# Patient Record
Sex: FEMALE | Race: WHITE | NOT HISPANIC OR LATINO | Employment: OTHER | URBAN - METROPOLITAN AREA
[De-identification: names, ages, dates, MRNs, and addresses within clinical notes are randomized per-mention and may not be internally consistent; named-entity substitution may affect disease eponyms.]

---

## 2023-09-02 PROBLEM — E04.9 NONTOXIC NODULAR GOITER: Status: ACTIVE | Noted: 2023-09-02

## 2023-09-02 PROBLEM — R42 LIGHTHEADEDNESS: Status: ACTIVE | Noted: 2023-09-02

## 2023-09-02 PROBLEM — D64.9 ANEMIA: Status: ACTIVE | Noted: 2023-09-02

## 2023-09-02 PROBLEM — I25.10 CAD (CORONARY ARTERY DISEASE): Status: ACTIVE | Noted: 2023-09-02

## 2023-09-02 PROBLEM — I36.1 NON-RHEUMATIC TRICUSPID VALVE INSUFFICIENCY: Status: ACTIVE | Noted: 2023-09-02

## 2023-09-02 PROBLEM — E07.9 THYROID DISEASE: Status: ACTIVE | Noted: 2023-09-02

## 2023-09-02 PROBLEM — G89.29 CHRONIC PAIN OF TOE OF RIGHT FOOT: Status: ACTIVE | Noted: 2023-09-02

## 2023-09-02 PROBLEM — I00 RHEUMATIC FEVER: Status: ACTIVE | Noted: 2023-09-02

## 2023-09-02 PROBLEM — K62.5 BRBPR (BRIGHT RED BLOOD PER RECTUM): Status: ACTIVE | Noted: 2023-09-02

## 2023-09-02 PROBLEM — I51.89 COMBINED SYSTOLIC AND DIASTOLIC CARDIAC DYSFUNCTION: Status: ACTIVE | Noted: 2023-09-02

## 2023-09-02 PROBLEM — R53.81 PHYSICAL DEBILITY: Status: ACTIVE | Noted: 2023-09-02

## 2023-09-02 PROBLEM — I49.5 SICK SINUS SYNDROME (MULTI): Status: ACTIVE | Noted: 2023-09-02

## 2023-09-02 PROBLEM — I48.20 CHRONIC A-FIB (MULTI): Status: ACTIVE | Noted: 2023-09-02

## 2023-09-02 PROBLEM — M79.674 CHRONIC PAIN OF TOE OF RIGHT FOOT: Status: ACTIVE | Noted: 2023-09-02

## 2023-09-02 PROBLEM — I51.9 MILD DIASTOLIC DYSFUNCTION: Status: ACTIVE | Noted: 2023-09-02

## 2023-09-02 PROBLEM — N18.9 CKD (CHRONIC KIDNEY DISEASE): Status: ACTIVE | Noted: 2023-09-02

## 2023-09-02 PROBLEM — M75.100 ROTATOR CUFF TEAR: Status: ACTIVE | Noted: 2023-09-02

## 2023-09-02 PROBLEM — Z96.651 KNEE JOINT REPLACEMENT STATUS, RIGHT: Status: ACTIVE | Noted: 2023-09-02

## 2023-09-02 PROBLEM — I50.9 CHF (CONGESTIVE HEART FAILURE) (MULTI): Status: ACTIVE | Noted: 2023-09-02

## 2023-09-02 PROBLEM — M81.0 OSTEOPOROSIS: Status: ACTIVE | Noted: 2023-09-02

## 2023-09-02 PROBLEM — I83.93 VARICOSE VEINS OF LEGS: Status: ACTIVE | Noted: 2023-09-02

## 2023-09-02 PROBLEM — B35.1 NAIL FUNGUS: Status: ACTIVE | Noted: 2023-09-02

## 2023-09-02 PROBLEM — M19.90 ARTHRITIS: Status: ACTIVE | Noted: 2023-09-02

## 2023-09-02 PROBLEM — G89.29 CHRONIC PAIN OF TOE OF LEFT FOOT: Status: ACTIVE | Noted: 2023-09-02

## 2023-09-02 PROBLEM — Z98.890 S/P MITRAL VALVE REPAIR: Status: ACTIVE | Noted: 2023-09-02

## 2023-09-02 PROBLEM — I27.20 PULMONARY HYPERTENSION (MULTI): Status: ACTIVE | Noted: 2023-09-02

## 2023-09-02 PROBLEM — M79.675 CHRONIC PAIN OF TOE OF LEFT FOOT: Status: ACTIVE | Noted: 2023-09-02

## 2023-09-02 PROBLEM — Z98.890 S/P TRICUSPID VALVE REPAIR: Status: ACTIVE | Noted: 2023-09-02

## 2023-09-02 PROBLEM — I10 HYPERTENSION: Status: ACTIVE | Noted: 2023-09-02

## 2023-09-02 PROBLEM — I35.1 MILD AORTIC INSUFFICIENCY: Status: ACTIVE | Noted: 2023-09-02

## 2023-09-02 PROBLEM — R60.0 BILATERAL LEG EDEMA: Status: ACTIVE | Noted: 2023-09-02

## 2023-09-02 PROBLEM — Z95.0 PACEMAKER: Status: ACTIVE | Noted: 2023-09-02

## 2023-09-02 RX ORDER — SERTRALINE HYDROCHLORIDE 25 MG/1
1 TABLET, FILM COATED ORAL DAILY
COMMUNITY
Start: 2019-07-13 | End: 2023-10-20 | Stop reason: ALTCHOICE

## 2023-09-02 RX ORDER — WARFARIN 3 MG/1
1 TABLET ORAL DAILY
COMMUNITY
End: 2023-10-20 | Stop reason: ALTCHOICE

## 2023-09-02 RX ORDER — MECLIZINE HCL 12.5 MG 12.5 MG/1
1-2 TABLET ORAL
COMMUNITY
End: 2023-12-22 | Stop reason: ALTCHOICE

## 2023-09-02 RX ORDER — TRAMADOL HYDROCHLORIDE 50 MG/1
TABLET ORAL
COMMUNITY
End: 2023-10-20 | Stop reason: ALTCHOICE

## 2023-09-02 RX ORDER — OMEPRAZOLE 40 MG/1
CAPSULE, DELAYED RELEASE ORAL
COMMUNITY
End: 2023-10-20 | Stop reason: ALTCHOICE

## 2023-09-02 RX ORDER — DEXTROMETHORPHAN HYDROBROMIDE, GUAIFENESIN 5; 100 MG/5ML; MG/5ML
650 LIQUID ORAL
COMMUNITY
End: 2023-10-20 | Stop reason: ALTCHOICE

## 2023-09-02 RX ORDER — ADHESIVE BANDAGE
BANDAGE TOPICAL
COMMUNITY

## 2023-09-02 RX ORDER — DEXTROMETHORPHAN HBR. AND GUAIFENESIN 10; 100 MG/5ML; MG/5ML
SOLUTION ORAL
COMMUNITY

## 2023-09-02 RX ORDER — TROLAMINE SALICYLATE 10 G/100G
1 CREAM TOPICAL SEE ADMIN INSTRUCTIONS
COMMUNITY

## 2023-09-02 RX ORDER — ACETAMINOPHEN 325 MG/1
1-2 TABLET ORAL EVERY 4 HOURS PRN
COMMUNITY

## 2023-09-02 RX ORDER — ACETAMINOPHEN 500 MG
TABLET ORAL
COMMUNITY
End: 2023-12-22 | Stop reason: ALTCHOICE

## 2023-09-02 RX ORDER — METOPROLOL TARTRATE 25 MG/1
1 TABLET, FILM COATED ORAL 2 TIMES DAILY
COMMUNITY
End: 2023-10-20 | Stop reason: ALTCHOICE

## 2023-09-02 RX ORDER — ACETAMINOPHEN 500 MG
1000 TABLET ORAL 3 TIMES DAILY
COMMUNITY
End: 2023-10-20 | Stop reason: ALTCHOICE

## 2023-09-02 RX ORDER — ALLOPURINOL 100 MG/1
1 TABLET ORAL DAILY
COMMUNITY

## 2023-09-02 RX ORDER — GUAIFENESIN 400 MG/1
TABLET ORAL
COMMUNITY
End: 2023-12-22 | Stop reason: DRUGHIGH

## 2023-09-02 RX ORDER — FERROUS SULFATE 325(65) MG
325 TABLET ORAL DAILY
COMMUNITY
Start: 2021-08-31

## 2023-09-02 RX ORDER — IPRATROPIUM BROMIDE AND ALBUTEROL SULFATE 2.5; .5 MG/3ML; MG/3ML
3 SOLUTION RESPIRATORY (INHALATION)
COMMUNITY

## 2023-09-02 RX ORDER — BUMETANIDE 2 MG/1
1 TABLET ORAL
COMMUNITY

## 2023-09-02 RX ORDER — SPIRONOLACTONE 25 MG/1
1 TABLET ORAL DAILY
COMMUNITY
End: 2023-12-22 | Stop reason: DRUGHIGH

## 2023-09-02 RX ORDER — PANTOPRAZOLE SODIUM 40 MG/1
40 TABLET, DELAYED RELEASE ORAL DAILY
COMMUNITY

## 2023-10-20 ENCOUNTER — TELEPHONE (OUTPATIENT)
Dept: CARDIOLOGY | Facility: CLINIC | Age: 88
End: 2023-10-20

## 2023-10-20 ENCOUNTER — OFFICE VISIT (OUTPATIENT)
Dept: CARDIOLOGY | Facility: CLINIC | Age: 88
End: 2023-10-20
Payer: COMMERCIAL

## 2023-10-20 VITALS
WEIGHT: 148 LBS | OXYGEN SATURATION: 96 % | HEART RATE: 71 BPM | BODY MASS INDEX: 24.66 KG/M2 | DIASTOLIC BLOOD PRESSURE: 62 MMHG | SYSTOLIC BLOOD PRESSURE: 114 MMHG | HEIGHT: 65 IN

## 2023-10-20 DIAGNOSIS — Z98.890 S/P TRICUSPID VALVE REPAIR: ICD-10-CM

## 2023-10-20 DIAGNOSIS — Z98.890 S/P MITRAL VALVE REPAIR: Primary | ICD-10-CM

## 2023-10-20 DIAGNOSIS — I48.20 CHRONIC A-FIB (MULTI): ICD-10-CM

## 2023-10-20 DIAGNOSIS — I27.20 PULMONARY HYPERTENSION (MULTI): ICD-10-CM

## 2023-10-20 DIAGNOSIS — I50.32 CHRONIC DIASTOLIC HEART FAILURE (MULTI): ICD-10-CM

## 2023-10-20 DIAGNOSIS — R06.09 DYSPNEA ON EXERTION: ICD-10-CM

## 2023-10-20 PROCEDURE — 1036F TOBACCO NON-USER: CPT | Performed by: INTERNAL MEDICINE

## 2023-10-20 PROCEDURE — 1160F RVW MEDS BY RX/DR IN RCRD: CPT | Performed by: INTERNAL MEDICINE

## 2023-10-20 PROCEDURE — 99215 OFFICE O/P EST HI 40 MIN: CPT | Performed by: INTERNAL MEDICINE

## 2023-10-20 PROCEDURE — 3078F DIAST BP <80 MM HG: CPT | Performed by: INTERNAL MEDICINE

## 2023-10-20 PROCEDURE — 3074F SYST BP LT 130 MM HG: CPT | Performed by: INTERNAL MEDICINE

## 2023-10-20 PROCEDURE — 1159F MED LIST DOCD IN RCRD: CPT | Performed by: INTERNAL MEDICINE

## 2023-10-20 RX ORDER — APIXABAN 2.5 MG/1
2.5 TABLET, FILM COATED ORAL 2 TIMES DAILY
COMMUNITY
Start: 2023-10-13

## 2023-10-20 RX ORDER — METOPROLOL SUCCINATE 25 MG/1
12.5 TABLET, EXTENDED RELEASE ORAL 2 TIMES DAILY
COMMUNITY
End: 2023-12-22 | Stop reason: DRUGHIGH

## 2023-10-20 NOTE — PATIENT INSTRUCTIONS
"You should increase your intake of fresh fruits and vegetables.  Try to consume 9-12 servings per day of such foods.  You should increase your intake of deep sea fish such as salmon and tuna.  Try to get two servings per week of fish, but if you are a pregnant woman, talk to your obstetrician before increasing your fish intake.  You should increase your intake of unprocessed nuts such as walnuts or almonds.  Increase your intake of plant-based protein.  You should avoid fried foods.  Don't consume sugary or starchy foods and sugary drinks.  Avoid saturated fats.  Try not to dine at restaurants more than once per month, and don't dine at fast food places.  Try to get 7-9 hours of sleep every night.  Try to get 150 minutes per week of moderate intensity exercise (after I have cleared you to start an exercise program).  Try to maintain the appropriate weight for your height based on body mass index (BMI). Maintain your cholesterol, blood sugar, and blood pressure in the recommended respective normal ranges.  There is a wealth of information on the American Heart Association's website regarding this.  Just Google \"Life's Essential 8\" for more information.   Ask me about any of these details  if you have questions.    As your cardiologist, I will be available to you at any time to answer any question you have concerning your heart health.  My staff, [facility] can also answer any questions you may have.  Best of luck.    "

## 2023-10-20 NOTE — TELEPHONE ENCOUNTER
Patient saw Dr. Morse today in office.  [facility] called to get clarification on medications.  Libra was taking Metoprolol Tartrate 12.5mg BID, per the After Visit Summary, it looks like Metoprolol was stopped, is this correct?    The [facility] will get Libra's ordered blood testing and chest x-ray done on Monday (10/23/23).    When calling back [facility] [phone] ask for [facility].    To Dr. Mosre for review.  ---ssd.

## 2023-10-20 NOTE — PROGRESS NOTES
"Chief Complaint:   See below (1 Yr. F/U - Not Sleeping)     History Of Present Illness:    Libra Marcelino is a 89 y.o. female presenting with chronic diastolic heart failure, chronic AF, pacemaker, S/P MV and TV repair.    The patient has a history of valvular heart disease and underwent mitral and tricuspid valve repairs with #31 ring in each position in April 2018. She has a history of sick sinus syndrome and is status post a prior [employer] Som pacemaker for which she follows with Dr. Valenzuela. She has chronic atrial fibrillation managed with rate control and anticoagulation. Her most recent echocardiogram in August 2021 disclosed normal LV function, moderate pulmonary hypertension moderate to severe tricuspid regurgitation, moderate mitral regurgitation, and moderate aortic regurgitation.     She lives at an extended care facility. She reports she that she gets more short of breath with walking around the [facility] where she lives. T the symptoms have become worse compared with her last visit here.  She also has mild to moderate lower extremity edema which is chronic.  He patient denies chest discomfort, palpitations, orthopnea, PND, syncope, and near syncope. She sleeps comfortably on one pillow.       Last Recorded Vitals:  Vitals:    10/20/23 1210   BP: 114/62   BP Location: Left arm   Patient Position: Sitting   BP Cuff Size: Large adult   Pulse: 71   SpO2: 96%   Weight: 67.1 kg (148 lb)   Height: 1.651 m (5' 5\")       Past Medical History:  She has no past medical history on file.    Past Surgical History:  She has a past surgical history that includes Knee Arthroplasty (04/04/2018); Other surgical history (04/04/2018); and Other surgical history (04/04/2018).      Social History:  She reports that she has never smoked. She has never used smokeless tobacco. She reports that she does not currently use alcohol. She reports that she does not use drugs.    Family History:  Family History   Problem Relation Name " Age of Onset    Goiter Mother      Lung cancer Mother      Thyroid disease Mother      Polycythemia Father      Lung cancer Sister          Allergies:  Patient has no known allergies.    Outpatient Medications:  Current Outpatient Medications   Medication Instructions    acetaminophen (TylenoL) 325 mg tablet 1-2 tablets, oral, Every 4 hours PRN    allopurinol (Zyloprim) 100 mg tablet 1 tablet, oral, Daily    bumetanide (Bumex) 2 mg tablet 1 tablet, oral, Daily    dextromethorphan-guaifenesin  mg/5 mL oral liquid Use as directed - as needed    Eliquis 2.5 mg, oral, 2 times daily    ferrous sulfate (FeosoL) 325 (65 Fe) MG tablet Feosol 200 (65 Fe) MG Oral Tablet   Quantity: 30  Refills: 0        Start : 31-Aug-2021   Active    guaiFENesin (Humibid 3) 400 mg tablet guaiFENesin TABS   Refills: 0       Active    ipratropium-albuteroL (Duo-Neb) 0.5-2.5 mg/3 mL nebulizer solution DuoNeb 0.5-2.5 (3) MG/3ML SOLN   Refills: 0       Active    mag hydrox/aluminum hyd/simeth (ALUM-MAG HYDROXIDE-SIMETH ORAL) Mylanta SUSP   Refills: 0       Active    magnesium hydroxide (Milk of Magnesia) 400 mg/5 mL suspension USE AS DIRECTED.    meclizine (Antivert) 12.5 mg tablet 1-2 tablets, oral, Daily RT    melatonin 5 mg tablet Melatonin 5 MG Oral Tablet   Refills: 0       Active    metoprolol succinate XL (TOPROL-XL) 12.5 mg, oral, 2 times daily, Do not crush or chew.    pantoprazole (ProtoNix) 40 mg EC tablet Pantoprazole Sodium 40 MG Oral Tablet Delayed Release   Refills: 0       Active    sennosides 15 mg tablet Senna 15 MG TABS   Refills: 0       Active    spironolactone (Aldactone) 25 mg tablet 1 tablet, oral, Daily    trolamine salicylate (Aspercreme) 10 % cream 1 Application, Topical, See admin instructions, Apply as directed by physician       Physical Exam:  GENERAL:  pleasant 89 year-old  HEENT: No xanthelasma  NECK: Supple, no palpable adenopathy or thyromegaly  CHEST: Clear to auscultation, respiratory effort  unlabored  CARDIAC: RRR, normal S1 and S2, no audible , rub, gallop, carotids are brisk, PMI is not displaced; there is a grade 2 systolic murmur heard at the left sternal border.  ABD: Active bowel sounds, nontender, no organomegaly, no evidence of ascites  EXT: No clubbing, cyanosis, , or tenderness.  There is 2-3+ bilateral lower extremity edema.  NEURO: Awake, alert, appropriate, speech is fluent       Last Labs:  CBC -  Lab Results   Component Value Date    WBC 6.2 02/28/2020    HGB 9.1 (L) 02/28/2020    HCT 27.8 (L) 02/28/2020    MCV 96 02/28/2020     02/28/2020       CMP -  Lab Results   Component Value Date    CALCIUM 8.4 (L) 02/28/2020    PHOS 3.4 02/28/2020    PROT 6.2 (L) 02/25/2020    ALBUMIN 3.4 02/25/2020    AST 11 02/25/2020    ALT 5 (L) 02/25/2020    ALKPHOS 84 02/25/2020    BILITOT 0.8 02/25/2020       LIPID PANEL -   Lab Results   Component Value Date    CHOL 132 04/13/2018    HDL 39.5 (A) 04/13/2018    CHHDL 3.3 04/13/2018       RENAL FUNCTION PANEL -   Lab Results   Component Value Date    GLUCOSE 97 02/28/2020     02/28/2020    K 4.1 02/28/2020     02/28/2020    CO2 29 02/28/2020    ANIONGAP 10 02/28/2020    BUN 17 02/28/2020    CREATININE 0.91 02/28/2020    CALCIUM 8.4 (L) 02/28/2020    PHOS 3.4 02/28/2020    ALBUMIN 3.4 02/25/2020        Lab Results   Component Value Date     (H) 02/25/2020    HGBA1C 5.3 04/14/2018         Lab review: I have Chemistry CMP:   Lab Results   Component Value Date    ALBUMIN 3.4 02/25/2020    CALCIUM 8.4 (L) 02/28/2020    CO2 29 02/28/2020    CREATININE 0.91 02/28/2020    GLUCOSE 97 02/28/2020    BILITOT 0.8 02/25/2020    PROT 6.2 (L) 02/25/2020    ALT 5 (L) 02/25/2020    AST 11 02/25/2020    ALKPHOS 84 02/25/2020   , Chemistry BMP   Lab Results   Component Value Date    GLUCOSE 97 02/28/2020    CALCIUM 8.4 (L) 02/28/2020    CO2 29 02/28/2020    CREATININE 0.91 02/28/2020   , and CBC:  Lab Results   Component Value Date    WBC 6.2  02/28/2020    RBC 2.91 (L) 02/28/2020    HGB 9.1 (L) 02/28/2020    HCT 27.8 (L) 02/28/2020    MCV 96 02/28/2020    MCHC 32.7 02/28/2020    RDW 15.9 (H) 02/28/2020    NRBC 0.0 02/28/2020       Assessment/Plan   Problem List Items Addressed This Visit          Cardiac and Vasculature    Chronic a-fib (CMS/HCC)    Relevant Medications    metoprolol succinate XL (Toprol-XL) 25 mg 24 hr tablet    Other Relevant Orders    Comprehensive metabolic panel    S/P mitral valve repair - Primary    Relevant Orders    Transthoracic Echo (TTE) Complete    S/P tricuspid valve repair    Pulmonary hypertension (CMS/HCC)    Dyspnea on exertion    Relevant Orders    Transthoracic Echo (TTE) Complete    B-Type Natriuretic Peptide    CBC    XR chest 2 views     Other Visit Diagnoses       Chronic diastolic heart failure (CMS/HCC)        Relevant Medications    metoprolol succinate XL (Toprol-XL) 25 mg 24 hr tablet          She has prior mitral and tricuspid valve repairs (not mechanical replacement) years ago.  On this basis, continued thromboprophylaxis with Eliquis is acceptable, understanding risks and benefits involved.  Maintaining her therapeutic with her INR on warfarin was a difficult challenge.    Yunier Morse MD

## 2023-12-20 ENCOUNTER — HOSPITAL ENCOUNTER (OUTPATIENT)
Dept: CARDIOLOGY | Facility: HOSPITAL | Age: 88
Discharge: HOME | End: 2023-12-20
Payer: COMMERCIAL

## 2023-12-20 DIAGNOSIS — R06.09 DYSPNEA ON EXERTION: ICD-10-CM

## 2023-12-20 DIAGNOSIS — Z98.890 S/P MITRAL VALVE REPAIR: ICD-10-CM

## 2023-12-20 PROCEDURE — 93306 TTE W/DOPPLER COMPLETE: CPT | Performed by: INTERNAL MEDICINE

## 2023-12-20 PROCEDURE — 93306 TTE W/DOPPLER COMPLETE: CPT

## 2023-12-22 ENCOUNTER — OFFICE VISIT (OUTPATIENT)
Dept: CARDIOLOGY | Facility: CLINIC | Age: 88
End: 2023-12-22
Payer: COMMERCIAL

## 2023-12-22 VITALS
HEIGHT: 64 IN | HEART RATE: 72 BPM | BODY MASS INDEX: 25.61 KG/M2 | SYSTOLIC BLOOD PRESSURE: 128 MMHG | DIASTOLIC BLOOD PRESSURE: 70 MMHG | WEIGHT: 150 LBS

## 2023-12-22 DIAGNOSIS — I48.20 CHRONIC A-FIB (MULTI): Primary | ICD-10-CM

## 2023-12-22 DIAGNOSIS — I34.0 MITRAL VALVE INSUFFICIENCY, UNSPECIFIED ETIOLOGY: ICD-10-CM

## 2023-12-22 DIAGNOSIS — I27.20 PULMONARY HYPERTENSION (MULTI): ICD-10-CM

## 2023-12-22 DIAGNOSIS — I36.1 NONRHEUMATIC TRICUSPID VALVE REGURGITATION: ICD-10-CM

## 2023-12-22 DIAGNOSIS — R60.0 BILATERAL LEG EDEMA: ICD-10-CM

## 2023-12-22 DIAGNOSIS — I50.32 CHRONIC DIASTOLIC HEART FAILURE (MULTI): ICD-10-CM

## 2023-12-22 LAB
AORTIC VALVE PEAK VELOCITY: 1.5
AV PEAK GRADIENT: 9
AVA (PEAK VEL): 2.01
EJECTION FRACTION APICAL 4 CHAMBER: 61.9
EJECTION FRACTION: 61
LEFT VENTRICLE INTERNAL DIMENSION DIASTOLE: 3.94 (ref 3.5–6)
LEFT VENTRICULAR OUTFLOW TRACT DIAMETER: 2
MITRAL VALVE E/E' RATIO: 26.82
RIGHT VENTRICLE PEAK SYSTOLIC PRESSURE: 64.6
TRICUSPID ANNULAR PLANE SYSTOLIC EXCURSION: 1

## 2023-12-22 PROCEDURE — 1159F MED LIST DOCD IN RCRD: CPT | Performed by: INTERNAL MEDICINE

## 2023-12-22 PROCEDURE — 99215 OFFICE O/P EST HI 40 MIN: CPT | Performed by: INTERNAL MEDICINE

## 2023-12-22 PROCEDURE — 1036F TOBACCO NON-USER: CPT | Performed by: INTERNAL MEDICINE

## 2023-12-22 PROCEDURE — 3074F SYST BP LT 130 MM HG: CPT | Performed by: INTERNAL MEDICINE

## 2023-12-22 PROCEDURE — 1160F RVW MEDS BY RX/DR IN RCRD: CPT | Performed by: INTERNAL MEDICINE

## 2023-12-22 PROCEDURE — 3078F DIAST BP <80 MM HG: CPT | Performed by: INTERNAL MEDICINE

## 2023-12-22 RX ORDER — METOPROLOL TARTRATE 25 MG/1
12.5 TABLET, FILM COATED ORAL 2 TIMES DAILY
COMMUNITY
Start: 2023-12-08

## 2023-12-22 RX ORDER — ONDANSETRON 4 MG/1
4 TABLET, FILM COATED ORAL EVERY 8 HOURS PRN
COMMUNITY
Start: 2023-03-28

## 2023-12-22 RX ORDER — AMOXICILLIN 250 MG
1 CAPSULE ORAL DAILY
COMMUNITY

## 2023-12-22 RX ORDER — TRAZODONE HYDROCHLORIDE 50 MG/1
25 TABLET ORAL NIGHTLY PRN
COMMUNITY
Start: 2023-12-15

## 2023-12-22 RX ORDER — GUAIFENESIN 600 MG/1
600 TABLET, EXTENDED RELEASE ORAL 2 TIMES DAILY PRN
COMMUNITY

## 2023-12-22 RX ORDER — SPIRONOLACTONE 25 MG/1
25 TABLET ORAL
COMMUNITY

## 2023-12-22 NOTE — PATIENT INSTRUCTIONS

## 2023-12-22 NOTE — PROGRESS NOTES
"Chief Complaint:   Please see below.     History Of Present Illness:    Libra Marcelino is a 89 y.o. female presenting with chronic diastolic heart failure, chronic AF, S/P PPM, S/P MV and TV repair.    The patient has a history of valvular heart disease and underwent mitral and tricuspid valve repairs with #31 ring in each position in April 2018. She has a history of sick sinus syndrome and is status post a prior [employer] pacemaker for which she follows with Dr. Valenzuela. She has chronic atrial fibrillation managed with rate control and anticoagulation. Her most recent echocardiogram on December 20, 2023 disclosed an ejection fraction of 55 to 60% with severe biatrial enlargement, severe pulmonary hypertension, with an estimated RV systolic pressure of 65 mm, moderate tricuspid regurgitation, mild mitral regurgitation, and moderate aortic regurgitation.    The patient has gained about 5 pounds in the past month or 2.  She continues to struggle with lower extremity edema.  She has noticed her ability to get around and walk at the extended care facility ([facility]) where she lives has declined over the past few months.  She sleeps comfortably on 1 pillow.  She has not had orthopnea, PND, syncope, or near syncope.  She denies bleeding problems on her anticoagulant.       Last Recorded Vitals:  Vitals:    12/22/23 1100   BP: 128/70   BP Location: Right arm   Patient Position: Sitting   Pulse: 72   Weight: 68 kg (150 lb)   Height: 1.626 m (5' 4\")   Body mass index is 25.75 kg/m².      Past Medical History:  She has no past medical history on file.    Past Surgical History:  She has a past surgical history that includes Knee Arthroplasty (04/04/2018); Other surgical history (04/04/2018); and Other surgical history (04/04/2018).      Social History:  She reports that she has never smoked. She has never used smokeless tobacco. She reports that she does not currently use alcohol. She reports that she does not use " drugs.    Family History:  Family History   Problem Relation Name Age of Onset    Goiter Mother      Lung cancer Mother      Thyroid disease Mother      Polycythemia Father      Lung cancer Sister          Allergies:  Patient has no known allergies.    Outpatient Medications:  Current Outpatient Medications   Medication Instructions    acetaminophen (TylenoL) 325 mg tablet 1-2 tablets, oral, Every 4 hours PRN    allopurinol (Zyloprim) 100 mg tablet 1 tablet, oral, Daily    bumetanide (Bumex) 2 mg tablet 1 tablet, oral, Daily PRN    dextromethorphan-guaifenesin  mg/5 mL oral liquid ( Robitussin DM )    Eliquis 2.5 mg, oral, 2 times daily    empagliflozin (JARDIANCE) 10 mg, oral, Daily    ferrous sulfate (FeosoL) 325 (65 Fe) MG tablet Take 1 tablet by mouth once daily.    guaiFENesin (MUCINEX) 600 mg, oral, 2 times daily PRN, Do not crush, chew, or split.    ipratropium-albuteroL (Duo-Neb) 0.5-2.5 mg/3 mL nebulizer solution 3 mL 4 times a day.    mag hydrox/aluminum hyd/simeth (ALUM-MAG HYDROXIDE-SIMETH ORAL) ( Mylanta )  Use as directed    magnesium hydroxide (Milk of Magnesia) 400 mg/5 mL suspension USE AS DIRECTED.    metoprolol tartrate (LOPRESSOR) 12.5 mg, oral, 2 times daily    ondansetron (ZOFRAN) 4 mg, oral, Every 8 hours PRN    pantoprazole (ProtoNix) 40 mg EC tablet Take 1 tablet (40 mg) by mouth once daily.    sennosides-docusate sodium (Senna Plus) 8.6-50 mg tablet 1 tablet, oral, Daily    spironolactone (ALDACTONE) 25 mg, oral, Every Tues, Thurs, Sat, Sun for CHF.    traZODone (DESYREL) 25 mg, oral, Nightly PRN    trolamine salicylate (Aspercreme) 10 % cream 1 Application, Topical, See admin instructions, Apply as directed by physician       Physical Exam:  GENERAL:  pleasant 89 year-old  HEENT: No xanthelasma  NECK: Supple, no palpable adenopathy or thyromegaly  CHEST: Clear to auscultation, respiratory effort unlabored  CARDIAC: RRR, normal S1 and S2, no audible rub, gallop, carotids are brisk,  PMI is not displaced: There is a 2/6 systolic murmur heard best at the LLSB.  ABD: Active bowel sounds, nontender, no organomegaly, no evidence of ascites  EXT: No clubbing, cyanosis,  or tenderness; there is 3+ bilateral lower extremity edema.  NEURO: Awake, alert, appropriate, speech is fluent       Last Labs:  CBC -  Lab Results   Component Value Date    WBC 6.2 02/28/2020    HGB 9.1 (L) 02/28/2020    HCT 27.8 (L) 02/28/2020    MCV 96 02/28/2020     02/28/2020       CMP -  Lab Results   Component Value Date    CALCIUM 8.4 (L) 02/28/2020    PHOS 3.4 02/28/2020    PROT 6.2 (L) 02/25/2020    ALBUMIN 3.4 02/25/2020    AST 11 02/25/2020    ALT 5 (L) 02/25/2020    ALKPHOS 84 02/25/2020    BILITOT 0.8 02/25/2020       LIPID PANEL -   Lab Results   Component Value Date    CHOL 132 04/13/2018    HDL 39.5 (A) 04/13/2018    CHHDL 3.3 04/13/2018       RENAL FUNCTION PANEL -   Lab Results   Component Value Date    GLUCOSE 97 02/28/2020     02/28/2020    K 4.1 02/28/2020     02/28/2020    CO2 29 02/28/2020    ANIONGAP 10 02/28/2020    BUN 17 02/28/2020    CREATININE 0.91 02/28/2020    CALCIUM 8.4 (L) 02/28/2020    PHOS 3.4 02/28/2020    ALBUMIN 3.4 02/25/2020        Lab Results   Component Value Date     (H) 02/25/2020    HGBA1C 5.3 04/14/2018         Diagnostic review: I have independently interpreted the Echocardiogram .  My findings are as summarized in the report.  No recent results to review    Assessment/Plan   Problem List Items Addressed This Visit          Cardiac and Vasculature    Chronic a-fib (CMS/HCC) - Primary    Relevant Medications    metoprolol tartrate (Lopressor) 25 mg tablet    Pulmonary hypertension (CMS/HCC)    Chronic diastolic heart failure (CMS/HCC)    Relevant Medications    metoprolol tartrate (Lopressor) 25 mg tablet    empagliflozin (Jardiance) 10 mg    Other Relevant Orders    Basic metabolic panel    Follow Up In Cardiology       Symptoms and Signs    Bilateral leg  edema     Other Visit Diagnoses       Nonrheumatic tricuspid valve regurgitation        Relevant Medications    metoprolol tartrate (Lopressor) 25 mg tablet    Mitral valve insufficiency, unspecified etiology        Relevant Medications    metoprolol tartrate (Lopressor) 25 mg tablet        1.  Chronic diastolic heart failure, LV ejection fraction 55 to 60% with RV dysfunction, pulmonary hypertension:  -Explained to the patient the etiology of her progression in symptoms  -Advised her to stay away from salt  -Important aspects of heart failure management were discussed in detail with the patient.  -Start Jardiance 10 mg daily  -Basic metabolic profile in 2 weeks  -Follow-up in 3 weeks  -At future visits we may need to push her loop diuretic.  -If she continues to be refractory to medical therapy, she may need to be admitted for intravenous diuretics.    2.  Chronic atrial fibrillation managed with rate control and anticoagulation:  -Continue present management  -Advised her to contact me should she develop any bleeding issues.  -Eliquis is an appropriate anticoagulant, since she has prior mitral valve repair and tricuspid valve repair and does not have mechanical heart valves in place.      Yunier Morse MD

## 2024-01-12 ENCOUNTER — TELEMEDICINE (OUTPATIENT)
Dept: CARDIOLOGY | Facility: CLINIC | Age: 89
End: 2024-01-12
Payer: COMMERCIAL

## 2024-01-12 DIAGNOSIS — I50.32 CHRONIC DIASTOLIC HEART FAILURE (MULTI): ICD-10-CM

## 2024-01-12 DIAGNOSIS — Z98.890 S/P MITRAL VALVE REPAIR: ICD-10-CM

## 2024-01-12 DIAGNOSIS — Z98.890 S/P TRICUSPID VALVE REPAIR: ICD-10-CM

## 2024-01-12 DIAGNOSIS — I48.20 CHRONIC ATRIAL FIBRILLATION (MULTI): Primary | ICD-10-CM

## 2024-01-12 DIAGNOSIS — Z95.0 PACEMAKER: ICD-10-CM

## 2024-01-12 DIAGNOSIS — I27.20 PULMONARY HYPERTENSION (MULTI): ICD-10-CM

## 2024-01-12 PROCEDURE — 99213 OFFICE O/P EST LOW 20 MIN: CPT | Performed by: INTERNAL MEDICINE

## 2024-01-12 NOTE — PATIENT INSTRUCTIONS
You need to stop smoking. As you know, smoking increases the risk of future heart attacks, strokes, cancer, and emphysema. In addition to these problems, someone who smokes a pack a day spends $2000 per year on tobacco alone. Those costs add up, so that someone who has smoked a pack a day for twenty years has spent $40,000 out of pocket on tobacco in their lifetime. To help you quit smoking, you should call the [place] Quit Line at 6-341-QUIT-NOW. They will have other tips to help you quit. Also, there are good medicines that can help you quit.  [facility] has a tobacco clinic that can guide you through the process.  Just let me know if you'd like a referral.

## 2024-01-12 NOTE — PROGRESS NOTES
Chief Complaint:   See Below (F/U Labs)     History Of Present Illness:    Libra Marcelino is a 89 y.o. female presenting with heart failure, atrial fibrillation, S/P MV and TV repair.    The patient has a history of valvular heart disease and underwent mitral and tricuspid valve repairs with #31 ring in each position in April 2018. She has a history of sick sinus syndrome and is status post a prior [employer] Som pacemaker for which she follows with Dr. Valenzuela. She has chronic atrial fibrillation managed with rate control and anticoagulation. Her most recent echocardiogram on December 20, 2023 disclosed an ejection fraction of 55 to 60% with severe biatrial enlargement, severe pulmonary hypertension, with an estimated RV systolic pressure of 65 mm, moderate tricuspid regurgitation, mild mitral regurgitation, and moderate aortic regurgitation.     The patient reports that she has episodic dyspnea with walking around the [facility].  The patient denies chest discomfort,  palpitations, orthopnea, PND, syncope, and near syncope.       Last Recorded Vitals:  There were no vitals filed for this visit.    Past Medical History:  She has no past medical history on file.    Past Surgical History:  She has a past surgical history that includes Knee Arthroplasty (04/04/2018); Other surgical history (04/04/2018); and Other surgical history (04/04/2018).      Social History:  She reports that she has never smoked. She has never used smokeless tobacco. She reports that she does not currently use alcohol. She reports that she does not use drugs.    Family History:  Family History   Problem Relation Name Age of Onset    Goiter Mother      Lung cancer Mother      Thyroid disease Mother      Polycythemia Father      Lung cancer Sister          Allergies:  Patient has no known allergies.    Outpatient Medications:  Current Outpatient Medications   Medication Instructions    acetaminophen (TylenoL) 325 mg tablet 1-2 tablets, oral, Every 4 hours  PRN    allopurinol (Zyloprim) 100 mg tablet 1 tablet, oral, Daily    bumetanide (Bumex) 2 mg tablet 1 tablet, oral, Daily PRN    dextromethorphan-guaifenesin  mg/5 mL oral liquid ( Robitussin DM )    Eliquis 2.5 mg, oral, 2 times daily    empagliflozin (JARDIANCE) 10 mg, oral, Daily    ferrous sulfate (FeosoL) 325 (65 Fe) MG tablet Take 1 tablet by mouth once daily.    guaiFENesin (MUCINEX) 600 mg, oral, 2 times daily PRN, Do not crush, chew, or split.    ipratropium-albuteroL (Duo-Neb) 0.5-2.5 mg/3 mL nebulizer solution 3 mL 4 times a day.    mag hydrox/aluminum hyd/simeth (ALUM-MAG HYDROXIDE-SIMETH ORAL) ( Mylanta )  Use as directed    magnesium hydroxide (Milk of Magnesia) 400 mg/5 mL suspension USE AS DIRECTED.    metoprolol tartrate (LOPRESSOR) 12.5 mg, oral, 2 times daily    ondansetron (ZOFRAN) 4 mg, oral, Every 8 hours PRN    pantoprazole (ProtoNix) 40 mg EC tablet Take 1 tablet (40 mg) by mouth once daily.    sennosides-docusate sodium (Senna Plus) 8.6-50 mg tablet 1 tablet, oral, Daily    spironolactone (ALDACTONE) 25 mg, oral, Every Tues, Thurs, Sat, Sun for CHF.    traZODone (DESYREL) 25 mg, oral, Nightly PRN    trolamine salicylate (Aspercreme) 10 % cream 1 Application, Topical, See admin instructions, Apply as directed by physician       Physical Exam:       Last Labs:  CBC -  Lab Results   Component Value Date    WBC 6.2 02/28/2020    HGB 9.1 (L) 02/28/2020    HCT 27.8 (L) 02/28/2020    MCV 96 02/28/2020     02/28/2020       CMP -  Lab Results   Component Value Date    CALCIUM 8.4 (L) 02/28/2020    PHOS 3.4 02/28/2020    PROT 6.2 (L) 02/25/2020    ALBUMIN 3.4 02/25/2020    AST 11 02/25/2020    ALT 5 (L) 02/25/2020    ALKPHOS 84 02/25/2020    BILITOT 0.8 02/25/2020       LIPID PANEL -   Lab Results   Component Value Date    CHOL 132 04/13/2018    HDL 39.5 (A) 04/13/2018    CHHDL 3.3 04/13/2018       RENAL FUNCTION PANEL -   Lab Results   Component Value Date    GLUCOSE 97 02/28/2020    NA  137 02/28/2020    K 4.1 02/28/2020     02/28/2020    CO2 29 02/28/2020    ANIONGAP 10 02/28/2020    BUN 17 02/28/2020    CREATININE 0.91 02/28/2020    CALCIUM 8.4 (L) 02/28/2020    PHOS 3.4 02/28/2020    ALBUMIN 3.4 02/25/2020        Lab Results   Component Value Date     (H) 02/25/2020    HGBA1C 5.3 04/14/2018         Lab review: I have Chemistry BMP   Lab Results   Component Value Date    GLUCOSE 97 02/28/2020    CALCIUM 8.4 (L) 02/28/2020    CO2 29 02/28/2020    CREATININE 0.91 02/28/2020     Diagnostic review: I have independently interpreted the Echocardiogram .  My findings are as summarized in the final report.    Assessment/Plan   Problem List Items Addressed This Visit          Cardiac and Vasculature    Pacemaker    S/P mitral valve repair    S/P tricuspid valve repair    Pulmonary hypertension (CMS/HCC)    Chronic diastolic heart failure (CMS/HCC)    Relevant Orders    Follow Up In Cardiology     Other Visit Diagnoses       Chronic atrial fibrillation (CMS/HCC)    -  Primary    Relevant Orders    Follow Up In Cardiology              Yunier Morse MD

## 2024-04-16 ENCOUNTER — APPOINTMENT (OUTPATIENT)
Dept: CARDIOLOGY | Facility: CLINIC | Age: 89
End: 2024-04-16
Payer: COMMERCIAL

## 2024-05-01 ENCOUNTER — OFFICE VISIT (OUTPATIENT)
Dept: CARDIOLOGY | Facility: CLINIC | Age: 89
End: 2024-05-01
Payer: COMMERCIAL

## 2024-05-01 VITALS
SYSTOLIC BLOOD PRESSURE: 118 MMHG | WEIGHT: 137 LBS | HEART RATE: 72 BPM | BODY MASS INDEX: 23.39 KG/M2 | DIASTOLIC BLOOD PRESSURE: 64 MMHG | HEIGHT: 64 IN

## 2024-05-01 DIAGNOSIS — Z98.890 S/P MITRAL VALVE REPAIR: ICD-10-CM

## 2024-05-01 DIAGNOSIS — I48.20 CHRONIC ATRIAL FIBRILLATION (MULTI): Primary | ICD-10-CM

## 2024-05-01 DIAGNOSIS — Z95.0 PACEMAKER: ICD-10-CM

## 2024-05-01 DIAGNOSIS — Z98.890 S/P TRICUSPID VALVE REPAIR: ICD-10-CM

## 2024-05-01 DIAGNOSIS — I36.1 NON-RHEUMATIC TRICUSPID VALVE INSUFFICIENCY: ICD-10-CM

## 2024-05-01 DIAGNOSIS — I50.32 CHRONIC DIASTOLIC HEART FAILURE (MULTI): ICD-10-CM

## 2024-05-01 PROCEDURE — 1036F TOBACCO NON-USER: CPT | Performed by: INTERNAL MEDICINE

## 2024-05-01 PROCEDURE — 99214 OFFICE O/P EST MOD 30 MIN: CPT | Performed by: INTERNAL MEDICINE

## 2024-05-01 PROCEDURE — 3078F DIAST BP <80 MM HG: CPT | Performed by: INTERNAL MEDICINE

## 2024-05-01 PROCEDURE — 1159F MED LIST DOCD IN RCRD: CPT | Performed by: INTERNAL MEDICINE

## 2024-05-01 PROCEDURE — 3074F SYST BP LT 130 MM HG: CPT | Performed by: INTERNAL MEDICINE

## 2024-05-01 PROCEDURE — 1157F ADVNC CARE PLAN IN RCRD: CPT | Performed by: INTERNAL MEDICINE

## 2024-05-01 NOTE — PROGRESS NOTES
"Chief Complaint:   Please see below.     History Of Present Illness:    Libra Marcelino is a 89 y.o. female presenting with  heart failure, atrial fibrillation, S/P MV and TV repair.     The patient has a history of valvular heart disease and underwent mitral and tricuspid valve repairs with #31 ring in each position in April 2018. She has a history of sick sinus syndrome and is status post a prior [employer] pacemaker for which she follows with Dr. Valenzuela. She has chronic atrial fibrillation managed with rate control and anticoagulation. Her most recent echocardiogram on December 20, 2023 disclosed an ejection fraction of 55 to 60% with severe biatrial enlargement, severe pulmonary hypertension, with an estimated RV systolic pressure of 65 mm, moderate tricuspid regurgitation, mild mitral regurgitation, and moderate aortic regurgitation.     The patient is a resident of an extended care facility, where she gets daily supervised care.  An aide from the extended care facility accompanies her to today's visit.  She gets a bit short of breath if she walks excessively at the extended care facility, she participates in the daily activities that include playing bingo, card games, word games, and jigsaw puzzles.  She enjoys the social interaction.    She denies bleeding problems on her anticoagulant.     Last Recorded Vitals:  Vitals:    05/01/24 1300   BP: 118/64   BP Location: Left arm   Patient Position: Sitting   Pulse: 72   Weight: 62.1 kg (137 lb)   Height: 1.626 m (5' 4\")       Past Medical History:  She has no past medical history on file.    Past Surgical History:  She has a past surgical history that includes Knee Arthroplasty (04/04/2018); Other surgical history (04/04/2018); and Other surgical history (04/04/2018).      Social History:  She reports that she has never smoked. She has never used smokeless tobacco. She reports that she does not currently use alcohol. She reports that she does not use " drugs.    Family History:  Family History   Problem Relation Name Age of Onset    Goiter Mother      Lung cancer Mother      Thyroid disease Mother      Polycythemia Father      Lung cancer Sister          Allergies:  Patient has no known allergies.    Outpatient Medications:  Current Outpatient Medications   Medication Instructions    acetaminophen (TylenoL) 325 mg tablet 1-2 tablets, oral, Every 4 hours PRN    allopurinol (Zyloprim) 100 mg tablet 1 tablet, oral, Daily    bumetanide (Bumex) 2 mg tablet 1 tablet, oral, Every Mon, Wed, Fri, Sun  ( also take 1 tablet for weight gain of >3 lbs )    dextromethorphan-guaifenesin  mg/5 mL oral liquid ( Robitussin DM )    Eliquis 2.5 mg, oral, 2 times daily    empagliflozin (JARDIANCE) 10 mg, oral, Daily    ferrous sulfate (FeosoL) 325 (65 Fe) MG tablet Take 1 tablet by mouth once daily.    guaiFENesin (MUCINEX) 600 mg, oral, 2 times daily PRN, Do not crush, chew, or split.    ipratropium-albuteroL (Duo-Neb) 0.5-2.5 mg/3 mL nebulizer solution 3 mL 4 times a day.    mag hydrox/aluminum hyd/simeth (ALUM-MAG HYDROXIDE-SIMETH ORAL) ( Mylanta )  Use as directed    magnesium hydroxide (Milk of Magnesia) 400 mg/5 mL suspension USE AS DIRECTED.    metoprolol tartrate (LOPRESSOR) 12.5 mg, oral, 2 times daily,  ( Hold if heart rate is under 60 bpm or SBP under 100 )    ondansetron (ZOFRAN) 4 mg, oral, Every 8 hours PRN    pantoprazole (ProtoNix) 40 mg EC tablet Take 1 tablet (40 mg) by mouth once daily.    sennosides-docusate sodium (Senna Plus) 8.6-50 mg tablet 1 tablet, oral, Daily    spironolactone (ALDACTONE) 25 mg, oral, Every Tues, Thurs, Sat, Sun for CHF.    traZODone (DESYREL) 25 mg, oral, Nightly PRN    trolamine salicylate (Aspercreme) 10 % cream 1 Application, Topical, See admin instructions, Apply as directed by physician       Physical Exam:  GENERAL:  pleasant 89 year-old  HEENT: No xanthelasma  NECK: Supple, no palpable adenopathy or thyromegaly  CHEST: Clear to  auscultation, respiratory effort unlabored  CARDIAC: RRR, normal S1 and S2, no audible murmur, rub, gallop, carotids are brisk, PMI is not displaced  ABD: Active bowel sounds, nontender, no organomegaly, no evidence of ascites  EXT: No clubbing, cyanosis,  or tenderness; there is bilateral lower extremity edema, with bilateral loer extremity dressings in place.  NEURO: Awake, alert, appropriate, speech is fluent       Last Labs:  CBC -  Lab Results   Component Value Date    WBC 6.2 02/28/2020    HGB 9.1 (L) 02/28/2020    HCT 27.8 (L) 02/28/2020    MCV 96 02/28/2020     02/28/2020       CMP -  Lab Results   Component Value Date    CALCIUM 8.4 (L) 02/28/2020    PHOS 3.4 02/28/2020    PROT 6.2 (L) 02/25/2020    ALBUMIN 3.4 02/25/2020    AST 11 02/25/2020    ALT 5 (L) 02/25/2020    ALKPHOS 84 02/25/2020    BILITOT 0.8 02/25/2020       LIPID PANEL -   Lab Results   Component Value Date    CHOL 132 04/13/2018    HDL 39.5 (A) 04/13/2018    CHHDL 3.3 04/13/2018       RENAL FUNCTION PANEL -   Lab Results   Component Value Date    GLUCOSE 97 02/28/2020     02/28/2020    K 4.1 02/28/2020     02/28/2020    CO2 29 02/28/2020    ANIONGAP 10 02/28/2020    BUN 17 02/28/2020    CREATININE 0.91 02/28/2020    CALCIUM 8.4 (L) 02/28/2020    PHOS 3.4 02/28/2020    ALBUMIN 3.4 02/25/2020        Lab Results   Component Value Date     (H) 02/25/2020    HGBA1C 5.3 04/14/2018         No recent results to review    Assessment/Plan   Problem List Items Addressed This Visit          Cardiac and Vasculature    Chronic a-fib (Multi) - Primary    S/P mitral valve repair    S/P tricuspid valve repair    Chronic diastolic heart failure (Multi)     Other Visit Diagnoses       Chronic atrial fibrillation (Multi)              Chronic atrial fibrillation: stable on anticoagulation, with adequate rate control.  Continue present management.  I've reviewed the patient's recent labs.  The patient has stage B class 3  heart failure,  and is stable on medical therapy.  Continue present medical regimen.  Discussed the importance of limiting sodium intake to 2000 mg daily, checking daily weights, and contacting us with weight gain in excess of 5 lbs in one week.      Yunier Morse MD

## 2024-05-01 NOTE — PATIENT INSTRUCTIONS

## 2024-10-29 ENCOUNTER — APPOINTMENT (OUTPATIENT)
Dept: CARDIOLOGY | Facility: CLINIC | Age: 89
End: 2024-10-29
Payer: COMMERCIAL

## 2024-10-30 ENCOUNTER — APPOINTMENT (OUTPATIENT)
Dept: CARDIOLOGY | Facility: CLINIC | Age: 89
End: 2024-10-30
Payer: COMMERCIAL

## 2024-10-30 VITALS
RESPIRATION RATE: 20 BRPM | HEIGHT: 64 IN | WEIGHT: 133 LBS | OXYGEN SATURATION: 96 % | DIASTOLIC BLOOD PRESSURE: 58 MMHG | BODY MASS INDEX: 22.71 KG/M2 | HEART RATE: 62 BPM | SYSTOLIC BLOOD PRESSURE: 120 MMHG

## 2024-10-30 DIAGNOSIS — Z98.890 S/P MITRAL VALVE REPAIR: ICD-10-CM

## 2024-10-30 DIAGNOSIS — Z98.890 S/P TRICUSPID VALVE REPAIR: ICD-10-CM

## 2024-10-30 DIAGNOSIS — I48.20 CHRONIC ATRIAL FIBRILLATION (MULTI): Primary | ICD-10-CM

## 2024-10-30 DIAGNOSIS — I50.32 CHRONIC DIASTOLIC HEART FAILURE: ICD-10-CM

## 2024-10-30 PROCEDURE — 3078F DIAST BP <80 MM HG: CPT | Performed by: INTERNAL MEDICINE

## 2024-10-30 PROCEDURE — 1160F RVW MEDS BY RX/DR IN RCRD: CPT | Performed by: INTERNAL MEDICINE

## 2024-10-30 PROCEDURE — 1157F ADVNC CARE PLAN IN RCRD: CPT | Performed by: INTERNAL MEDICINE

## 2024-10-30 PROCEDURE — 1159F MED LIST DOCD IN RCRD: CPT | Performed by: INTERNAL MEDICINE

## 2024-10-30 PROCEDURE — 1036F TOBACCO NON-USER: CPT | Performed by: INTERNAL MEDICINE

## 2024-10-30 PROCEDURE — G2211 COMPLEX E/M VISIT ADD ON: HCPCS | Performed by: INTERNAL MEDICINE

## 2024-10-30 PROCEDURE — 3074F SYST BP LT 130 MM HG: CPT | Performed by: INTERNAL MEDICINE

## 2024-10-30 PROCEDURE — 99214 OFFICE O/P EST MOD 30 MIN: CPT | Performed by: INTERNAL MEDICINE

## 2024-10-30 RX ORDER — WARFARIN 3 MG/1
3 TABLET ORAL
COMMUNITY
Start: 2023-07-20 | End: 2024-10-30 | Stop reason: WASHOUT

## 2024-10-30 RX ORDER — WARFARIN 2.5 MG/1
2.5 TABLET ORAL
COMMUNITY
Start: 2023-07-13 | End: 2024-10-30 | Stop reason: WASHOUT

## 2024-10-30 RX ORDER — WARFARIN 1 MG/1
1 TABLET ORAL
COMMUNITY
Start: 2023-07-13 | End: 2024-10-30 | Stop reason: WASHOUT

## 2025-04-30 ENCOUNTER — APPOINTMENT (OUTPATIENT)
Dept: CARDIOLOGY | Facility: CLINIC | Age: OVER 89
End: 2025-04-30
Payer: COMMERCIAL

## 2025-05-02 ENCOUNTER — APPOINTMENT (OUTPATIENT)
Dept: CARDIOLOGY | Facility: CLINIC | Age: OVER 89
End: 2025-05-02
Payer: COMMERCIAL